# Patient Record
Sex: FEMALE | Race: WHITE | NOT HISPANIC OR LATINO | URBAN - METROPOLITAN AREA
[De-identification: names, ages, dates, MRNs, and addresses within clinical notes are randomized per-mention and may not be internally consistent; named-entity substitution may affect disease eponyms.]

---

## 2019-11-01 ENCOUNTER — APPOINTMENT (RX ONLY)
Dept: URBAN - METROPOLITAN AREA OTHER 10 | Facility: OTHER | Age: 69
Setting detail: DERMATOLOGY
End: 2019-11-01

## 2019-11-01 DIAGNOSIS — L50.8 OTHER URTICARIA: ICD-10-CM

## 2019-11-01 PROBLEM — I10 ESSENTIAL (PRIMARY) HYPERTENSION: Status: ACTIVE | Noted: 2019-11-01

## 2019-11-01 PROBLEM — E78.5 HYPERLIPIDEMIA, UNSPECIFIED: Status: ACTIVE | Noted: 2019-11-01

## 2019-11-01 PROBLEM — R23.3 SPONTANEOUS ECCHYMOSES: Status: ACTIVE | Noted: 2019-11-01

## 2019-11-01 PROBLEM — E13.9 OTHER SPECIFIED DIABETES MELLITUS WITHOUT COMPLICATIONS: Status: ACTIVE | Noted: 2019-11-01

## 2019-11-01 PROCEDURE — ? ORDER TESTS

## 2019-11-01 PROCEDURE — 99202 OFFICE O/P NEW SF 15 MIN: CPT

## 2019-11-01 PROCEDURE — ? COUNSELING

## 2019-11-01 PROCEDURE — ? TREATMENT REGIMEN

## 2019-11-01 PROCEDURE — ? PRESCRIPTION

## 2019-11-01 RX ORDER — HYDROXYZINE HYDROCHLORIDE 25 MG/1
TABLET, FILM COATED ORAL
Qty: 30 | Refills: 0 | Status: ERX | COMMUNITY
Start: 2019-11-01

## 2019-11-01 RX ORDER — CETIRIZINE HCL/PSEUDOEPHEDRINE 5 MG-120MG
TABLET, EXTENDED RELEASE 12 HR ORAL QD
Qty: 30 | Refills: 0 | Status: ERX | COMMUNITY
Start: 2019-11-01

## 2019-11-01 RX ORDER — RANITIDINE 150 MG/1
TABLET, FILM COATED ORAL QHS
Qty: 30 | Refills: 0 | Status: ERX | COMMUNITY
Start: 2019-11-01

## 2019-11-01 RX ADMIN — HYDROXYZINE HYDROCHLORIDE: 25 TABLET, FILM COATED ORAL at 15:17

## 2019-11-01 RX ADMIN — Medication: at 15:16

## 2019-11-01 RX ADMIN — RANITIDINE: 150 TABLET, FILM COATED ORAL at 15:17

## 2019-11-01 ASSESSMENT — LOCATION ZONE DERM
LOCATION ZONE: NECK
LOCATION ZONE: LEG

## 2019-11-01 ASSESSMENT — LOCATION DETAILED DESCRIPTION DERM
LOCATION DETAILED: LEFT ANTERIOR PROXIMAL THIGH
LOCATION DETAILED: RIGHT ANTERIOR PROXIMAL THIGH
LOCATION DETAILED: RIGHT INFERIOR ANTERIOR NECK

## 2019-11-01 ASSESSMENT — LOCATION SIMPLE DESCRIPTION DERM
LOCATION SIMPLE: RIGHT THIGH
LOCATION SIMPLE: LEFT THIGH
LOCATION SIMPLE: RIGHT ANTERIOR NECK

## 2019-11-01 NOTE — HPI: RASH
What Type Of Note Output Would You Prefer (Optional)?: Bullet Format
How Severe Is Your Rash?: moderate
Is This A New Presentation, Or A Follow-Up?: Rash
Additional History: Been to the doctor and emergency room , Triamcinolone and nystatin creams didn’t resolve rash , hydroxyzine has helped.

## 2019-11-01 NOTE — PROCEDURE: TREATMENT REGIMEN
Initiate Treatment: Zyrtec take one tablet in the morning\\nRanitidine take one tablet in the afternoon \\nHydroxyzine 25 mg once at night
Plan: Pt returned from a trip to [facility] in June and started new medications for HTN. Shortly after the urticaria intitiated and have continued without reprieve until a week ago when Pt was prescribed hydroxyzine. Pt has no evidence today, but showed pictures of urticaria. Pt had some labs in July that were WNL. Will order chronic urticaria labs today and start Pt on an antihistamine regimen for 4 weeks. If labs are WNL and uricaria returns following d/c in 4 weeks will try a medication trial with pt's recently added anti-hypertensives.
Detail Level: Simple

## 2019-11-01 NOTE — PROCEDURE: ORDER TESTS
Bill For Surgical Tray: no
Expected Date Of Service: 11/01/2019
Performing Laboratory: [id number]
Billing Type: Client Bill

## 2019-11-05 ENCOUNTER — RX ONLY (OUTPATIENT)
Age: 69
Setting detail: RX ONLY
End: 2019-11-05

## 2019-11-05 RX ORDER — AMOXICILLIN 500 MG/1
TABLET, FILM COATED ORAL
Qty: 21 | Refills: 0 | Status: ERX | COMMUNITY
Start: 2019-11-05